# Patient Record
Sex: FEMALE | Employment: FULL TIME | URBAN - METROPOLITAN AREA
[De-identification: names, ages, dates, MRNs, and addresses within clinical notes are randomized per-mention and may not be internally consistent; named-entity substitution may affect disease eponyms.]

---

## 2023-12-23 ENCOUNTER — OFFICE VISIT (OUTPATIENT)
Dept: URGENT CARE | Facility: URGENT CARE | Age: 48
End: 2023-12-23
Payer: COMMERCIAL

## 2023-12-23 VITALS
OXYGEN SATURATION: 99 % | DIASTOLIC BLOOD PRESSURE: 76 MMHG | TEMPERATURE: 97.8 F | HEART RATE: 83 BPM | SYSTOLIC BLOOD PRESSURE: 111 MMHG

## 2023-12-23 DIAGNOSIS — R21 RASH AND NONSPECIFIC SKIN ERUPTION: Primary | ICD-10-CM

## 2023-12-23 PROCEDURE — 99207 PR NO CHARGE LOS: CPT | Performed by: FAMILY MEDICINE

## 2023-12-23 NOTE — PROGRESS NOTES
Assessment & Plan     Rash and nonspecific skin eruption    Unable to determine etiology at this time but reassured that this does not appear consistent with Lyme's with multiple lesions and unusual time for tick bites in [place], not consistent with tinea corporis or ??  No charge for visit today with inability to dx or treat.  Recommend close Follow-up if any change or worsening prn.    Fallon Palomo MD  [facility] KALI Erazo is a 48 year old, presenting for the following health issues:  Insect Bites (Patient reports bites right leg, bilateral arms with classic ring around it since 2 day ago.)      HPI     Presents as visitor to [place]- was in [place] prior to [place] 12/8-- headed to [place] and home to [place] next week.  Has been staying at parents home in [place]- awoke overnight Wednesday with 4 new red spots on RIGHT thigh and calf and LUE.  Was worried about scabies so did Permethrin overnight Thursday- now with 3 new lesions on RUE and hand.    Mild itch.  No skin breakdown.  No fever, myalgias.  Has one photo where initial spots looked like a bulls-eye but this resolved quickly and is not present now.    Feels lesions are tender.  No streaking.    Is otherwise healthy.    No one else with similar lesions.    Denies any known bug bites.  No allergies.    Review of Systems   Constitutional, HEENT, cardiovascular, pulmonary, GI, , musculoskeletal, neuro, skin, endocrine and psych systems are negative, except as otherwise noted.      Objective    /76   Pulse 83   Temp 97.8  F (36.6  C) (Tympanic)   SpO2 99%   There is no height or weight on file to calculate BMI.  Physical Exam   GENERAL: healthy, alert and no distress  EYES: Eyes grossly normal to inspection, PERRL and conjunctivae and sclerae normal  SKIN: 5-6 red macules size of dime-nickel on BLEs and BUEs.  Not draining, no streaking.  PSYCH: mentation appears normal, affect normal/bright